# Patient Record
Sex: FEMALE | Employment: UNEMPLOYED | URBAN - METROPOLITAN AREA
[De-identification: names, ages, dates, MRNs, and addresses within clinical notes are randomized per-mention and may not be internally consistent; named-entity substitution may affect disease eponyms.]

---

## 2017-01-21 ENCOUNTER — HOSPITAL ENCOUNTER (EMERGENCY)
Facility: HOSPITAL | Age: 27
Discharge: NURSING, REHAB OR LONG-TERM CARE | End: 2017-01-21
Attending: EMERGENCY MEDICINE
Payer: MEDICAID

## 2017-01-21 VITALS
HEART RATE: 87 BPM | TEMPERATURE: 98 F | HEIGHT: 63 IN | DIASTOLIC BLOOD PRESSURE: 70 MMHG | BODY MASS INDEX: 26.58 KG/M2 | RESPIRATION RATE: 16 BRPM | WEIGHT: 150 LBS | SYSTOLIC BLOOD PRESSURE: 116 MMHG | OXYGEN SATURATION: 98 %

## 2017-01-21 DIAGNOSIS — F10.920 ALCOHOL INTOXICATION, UNCOMPLICATED (HCC): ICD-10-CM

## 2017-01-21 DIAGNOSIS — F12.10 MARIJUANA ABUSE: ICD-10-CM

## 2017-01-21 DIAGNOSIS — F14.10 COCAINE ABUSE (HCC): ICD-10-CM

## 2017-01-21 DIAGNOSIS — Z72.89 DELIBERATE SELF-CUTTING: Primary | ICD-10-CM

## 2017-01-21 DIAGNOSIS — Z00.8 MEDICAL CLEARANCE FOR PSYCHIATRIC ADMISSION: ICD-10-CM

## 2017-01-21 DIAGNOSIS — T14.91XA SUICIDAL BEHAVIOR WITH ATTEMPTED SELF-INJURY (HCC): ICD-10-CM

## 2017-01-21 LAB
ALBUMIN SERPL BCP-MCNC: 3.8 G/DL (ref 3.5–4.8)
ALP SERPL-CCNC: 53 U/L (ref 32–100)
ALT SERPL-CCNC: 22 U/L (ref 14–54)
ANION GAP SERPL CALC-SCNC: 9 MMOL/L (ref 0–18)
APAP SERPL-MCNC: 10 MCG/ML (ref 10–30)
APAP SERPL-MCNC: 39 MCG/ML (ref 10–30)
AST SERPL-CCNC: 25 U/L (ref 15–41)
B-HCG UR QL: NEGATIVE
BASOPHILS # BLD: 0 K/UL (ref 0–0.2)
BASOPHILS NFR BLD: 0 %
BILIRUB DIRECT SERPL-MCNC: 0 MG/DL (ref 0–0.2)
BILIRUB SERPL-MCNC: 0.3 MG/DL (ref 0.3–1.2)
BUN SERPL-MCNC: 9 MG/DL (ref 8–20)
BUN/CREAT SERPL: 13.8 (ref 10–20)
BZE UR QL SCN: DETECTED
CALCIUM SERPL-MCNC: 8.9 MG/DL (ref 8.5–10.5)
CANNABINOIDS UR QL SCN: DETECTED
CHLORIDE SERPL-SCNC: 112 MMOL/L (ref 95–110)
CK SERPL-CCNC: 63 U/L (ref 38–234)
CO2 SERPL-SCNC: 23 MMOL/L (ref 22–32)
CREAT SERPL-MCNC: 0.65 MG/DL (ref 0.5–1.5)
EOSINOPHIL # BLD: 0 K/UL (ref 0–0.7)
EOSINOPHIL NFR BLD: 1 %
ERYTHROCYTE [DISTWIDTH] IN BLOOD BY AUTOMATED COUNT: 14.1 % (ref 11–15)
ETHANOL SERPL-MCNC: 171 MG/DL
ETHANOL SERPL-MCNC: 23 MG/DL
GLUCOSE SERPL-MCNC: 93 MG/DL (ref 70–99)
HCT VFR BLD AUTO: 39.4 % (ref 35–48)
HGB BLD-MCNC: 13.3 G/DL (ref 12–16)
LYMPHOCYTES # BLD: 2.5 K/UL (ref 1–4)
LYMPHOCYTES NFR BLD: 34 %
MAGNESIUM SERPL-MCNC: 2.2 MG/DL (ref 1.8–2.5)
MCH RBC QN AUTO: 31.5 PG (ref 27–32)
MCHC RBC AUTO-ENTMCNC: 33.7 G/DL (ref 32–37)
MCV RBC AUTO: 93.6 FL (ref 80–100)
MONOCYTES # BLD: 0.6 K/UL (ref 0–1)
MONOCYTES NFR BLD: 9 %
NEUTROPHILS # BLD AUTO: 4.2 K/UL (ref 1.8–7.7)
NEUTROPHILS NFR BLD: 57 %
OSMOLALITY UR CALC.SUM OF ELEC: 296 MOSM/KG (ref 275–295)
PLATELET # BLD AUTO: 288 K/UL (ref 140–400)
PMV BLD AUTO: 8.8 FL (ref 7.4–10.3)
POTASSIUM SERPL-SCNC: 4.4 MMOL/L (ref 3.3–5.1)
PROT SERPL-MCNC: 7.5 G/DL (ref 5.9–8.4)
RBC # BLD AUTO: 4.2 M/UL (ref 3.7–5.4)
SALICYLATES SERPL-MCNC: <4 MG/DL (ref 2–29)
SODIUM SERPL-SCNC: 144 MMOL/L (ref 136–144)
WBC # BLD AUTO: 7.3 K/UL (ref 4–11)

## 2017-01-21 PROCEDURE — 80329 ANALGESICS NON-OPIOID 1 OR 2: CPT | Performed by: EMERGENCY MEDICINE

## 2017-01-21 PROCEDURE — 80048 BASIC METABOLIC PNL TOTAL CA: CPT | Performed by: EMERGENCY MEDICINE

## 2017-01-21 PROCEDURE — 80076 HEPATIC FUNCTION PANEL: CPT | Performed by: EMERGENCY MEDICINE

## 2017-01-21 PROCEDURE — 82550 ASSAY OF CK (CPK): CPT | Performed by: EMERGENCY MEDICINE

## 2017-01-21 PROCEDURE — 80320 DRUG SCREEN QUANTALCOHOLS: CPT | Performed by: EMERGENCY MEDICINE

## 2017-01-21 PROCEDURE — 83735 ASSAY OF MAGNESIUM: CPT | Performed by: EMERGENCY MEDICINE

## 2017-01-21 PROCEDURE — 80307 DRUG TEST PRSMV CHEM ANLYZR: CPT | Performed by: EMERGENCY MEDICINE

## 2017-01-21 PROCEDURE — 85025 COMPLETE CBC W/AUTO DIFF WBC: CPT | Performed by: EMERGENCY MEDICINE

## 2017-01-21 PROCEDURE — 99285 EMERGENCY DEPT VISIT HI MDM: CPT

## 2017-01-21 PROCEDURE — 93005 ELECTROCARDIOGRAM TRACING: CPT

## 2017-01-21 PROCEDURE — 81025 URINE PREGNANCY TEST: CPT | Performed by: EMERGENCY MEDICINE

## 2017-01-21 PROCEDURE — 93010 ELECTROCARDIOGRAM REPORT: CPT | Performed by: EMERGENCY MEDICINE

## 2017-01-21 PROCEDURE — 36415 COLL VENOUS BLD VENIPUNCTURE: CPT

## 2017-01-21 RX ORDER — LORAZEPAM 2 MG/ML
2 INJECTION INTRAMUSCULAR ONCE
Status: DISCONTINUED | OUTPATIENT
Start: 2017-01-21 | End: 2017-01-21
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
## 2017-01-21 NOTE — ED NOTES
Spoke with Avery Martinez case # [id number] from poison control. Stated patient has elevated acetaminophen level. Instructed by Avery Martinez to do a repeat tylenol level 4 hours after initial blood draw (repeat at approx 4pm) Lab addon for magnesium level and ck.  Treat symptom

## 2017-01-21 NOTE — ED NOTES
Per security patient cannot be in scrubs. Patient changed into gown, cooperatively. Ativan held at this time.  Dr. David Amaro made aware

## 2017-01-21 NOTE — ED NOTES
Patient tried to escape. Patient found by triage by security. Patient escorted back to room 24 by security and placed on seclusion.

## 2017-01-21 NOTE — ED NOTES
Patient arrived via ems. On talking with patient, pt states she \"took a bunch of melatonin this morning because I could not sleep\". \"I also drank a couple of beers at 7\". Patient denies any current suicidal or homicidal ideation.  +wrist lac noted to bi

## 2017-01-21 NOTE — ED INITIAL ASSESSMENT (HPI)
Pt brought in by EMS for psych evaluation, Pt drinking ETOH all night, took unknown amount of sleeping pills, cut marks on wrist in attempt to harm self.
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
## 2017-01-21 NOTE — ED NOTES
Per speaking with Dr. Babak Anne, patient stated she took three  500mg of tylenol at 7am this morning.
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
## 2017-01-21 NOTE — BH PROGRESS NOTE
Message left for Intake at [facility]. [facility] is OON with Medicaid.

## 2017-01-21 NOTE — ED NOTES
Security at bedside. Patient refusing to change into gown. Explained to patient of need to change into gown. Patient states she does not want to be naked. Scrubs provided to pt to change into.

## 2017-01-22 NOTE — BH PROGRESS NOTE
David called back and will accept pt. Intake will call back with Accepting Dr, bed # and nurse to nurse #.

## 2017-01-22 NOTE — BH PROGRESS NOTE
Accepting is Dr. Aramis Hernandez. Pt will be going to [address], Bed [address]. Nurse to Nurse is [phone] and ask for Addy pendleton. Intake is requesting a faxed copy of pt's EKG results also.

## 2017-01-22 NOTE — ED NOTES
[facility] arrived to transport patient to [facility]. Patient signed EMTALA form. Transfer documents provided to EMS personnel.

## 2017-01-22 NOTE — ED NOTES
Patient accepted to [facility] 76. 530 bed 1. Patient made aware. Accepting md is dr. Scott Rodriges.  Report given to Chasity Martinez [phone]

## 2018-10-28 ENCOUNTER — HOSPITAL ENCOUNTER (EMERGENCY)
Facility: HOSPITAL | Age: 28
Discharge: HOME | End: 2018-10-28
Attending: EMERGENCY MEDICINE
Payer: COMMERCIAL

## 2018-10-28 ENCOUNTER — APPOINTMENT (OUTPATIENT)
Dept: CT IMAGING | Facility: HOSPITAL | Age: 28
End: 2018-10-28
Attending: EMERGENCY MEDICINE
Payer: COMMERCIAL

## 2018-10-28 VITALS
RESPIRATION RATE: 16 BRPM | HEIGHT: 63 IN | DIASTOLIC BLOOD PRESSURE: 69 MMHG | BODY MASS INDEX: 28.35 KG/M2 | SYSTOLIC BLOOD PRESSURE: 103 MMHG | WEIGHT: 160 LBS | HEART RATE: 114 BPM | OXYGEN SATURATION: 97 %

## 2018-10-28 DIAGNOSIS — S01.01XA SCALP LACERATION, INITIAL ENCOUNTER: Primary | ICD-10-CM

## 2018-10-28 PROCEDURE — 72125 CT NECK SPINE W/O DYE: CPT | Performed by: EMERGENCY MEDICINE

## 2018-10-28 PROCEDURE — 99284 EMERGENCY DEPT VISIT MOD MDM: CPT

## 2018-10-28 PROCEDURE — 12001 RPR S/N/AX/GEN/TRNK 2.5CM/<: CPT

## 2018-10-28 PROCEDURE — 70450 CT HEAD/BRAIN W/O DYE: CPT | Performed by: EMERGENCY MEDICINE

## 2018-10-28 PROCEDURE — 81025 URINE PREGNANCY TEST: CPT
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
## 2020-11-09 ENCOUNTER — HOSPITAL ENCOUNTER (EMERGENCY)
Facility: HOSPITAL | Age: 30
Discharge: HOME | End: 2020-11-09
Attending: EMERGENCY MEDICINE
Payer: COMMERCIAL

## 2020-11-09 ENCOUNTER — APPOINTMENT (OUTPATIENT)
Dept: GENERAL RADIOLOGY | Facility: HOSPITAL | Age: 30
End: 2020-11-09
Attending: EMERGENCY MEDICINE
Payer: COMMERCIAL

## 2020-11-09 VITALS
BODY MASS INDEX: 26.58 KG/M2 | TEMPERATURE: 98 F | SYSTOLIC BLOOD PRESSURE: 132 MMHG | OXYGEN SATURATION: 97 % | WEIGHT: 150 LBS | HEART RATE: 84 BPM | DIASTOLIC BLOOD PRESSURE: 96 MMHG | HEIGHT: 63 IN | RESPIRATION RATE: 18 BRPM

## 2020-11-09 DIAGNOSIS — S92.344A CLOSED NONDISPLACED FRACTURE OF FOURTH METATARSAL BONE OF RIGHT FOOT, INITIAL ENCOUNTER: Primary | ICD-10-CM

## 2020-11-09 PROCEDURE — 99284 EMERGENCY DEPT VISIT MOD MDM: CPT

## 2020-11-09 PROCEDURE — 73610 X-RAY EXAM OF ANKLE: CPT | Performed by: EMERGENCY MEDICINE

## 2020-11-09 PROCEDURE — 73630 X-RAY EXAM OF FOOT: CPT | Performed by: EMERGENCY MEDICINE

## 2020-11-09 PROCEDURE — 29515 APPLICATION SHORT LEG SPLINT: CPT

## 2020-11-09 RX ORDER — IBUPROFEN 600 MG/1
600 TABLET ORAL ONCE
Status: COMPLETED | OUTPATIENT
Start: 2020-11-09 | End: 2020-11-09
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
## 2020-11-09 NOTE — ED PROVIDER NOTES
Patient Seen in: [facility] AND [facility] Emergency Department      History   Patient presents with:  Leg or Foot Injury    Stated Complaint:     HPI    80-year-old female presents for evaluation of right foot pain.   Patient was sliding down an empty water slid 1+ on the right side and 1+ on the left side. Pulmonary:      Effort: Pulmonary effort is normal. No respiratory distress.    Musculoskeletal:      Comments: Mild tenderness to right ankle, tender to proximal right foot, difficulty ranging due to this jose miguel am    Follow-up:  Fiona [address]  [phone]    Schedule an appointment as soon as possible for a visit in 3 days  For follow up (podiatry)    Akin Lara MD  [address] [place] [address]

## 2020-11-09 NOTE — ED INITIAL ASSESSMENT (HPI)
Pt reports fixing a roof and fell. Pt states that it was about 20 ft. Pt reports \" I broke my right foot\" pt denies LOC.  Cms intact

## 2022-08-02 NOTE — ED NOTES
PT safe to DC home per MD. Trae De La Cruz to dress self. DC teaching done, pt verbalizes understanding. Ambulatory with crutches to exit. 71

## 2023-11-04 NOTE — BH PROGRESS NOTE
Packet faxed to [facility] for review. The patient is Stable - Low risk of patient condition declining or worsening    Shift Goals  Clinical Goals: safety, pain management  Patient Goals: safety, pain management  Family Goals: JI    Progress made toward(s) clinical / shift goals:      Problem: Bladder / Voiding  Goal: Patient will establish and maintain regular urinary output  Outcome: Progressing  Note: Pt continent of bladder. Voiding adequately using urinal. He denies dysuria.     Problem: Bowel Elimination  Goal: Patient will participate in bowel management program  Outcome: Progressing  Note: Pt continent of bowel. On bowel meds. LBM 11/3/23.       Patient is not progressing towards the following goals:

## 2023-12-27 ENCOUNTER — APPOINTMENT (OUTPATIENT)
Dept: GENERAL RADIOLOGY | Facility: HOSPITAL | Age: 33
End: 2023-12-27
Attending: STUDENT IN AN ORGANIZED HEALTH CARE EDUCATION/TRAINING PROGRAM
Payer: MEDICAID

## 2023-12-27 ENCOUNTER — APPOINTMENT (OUTPATIENT)
Dept: CT IMAGING | Facility: HOSPITAL | Age: 33
End: 2023-12-27
Attending: STUDENT IN AN ORGANIZED HEALTH CARE EDUCATION/TRAINING PROGRAM
Payer: MEDICAID

## 2023-12-27 ENCOUNTER — HOSPITAL ENCOUNTER (EMERGENCY)
Facility: HOSPITAL | Age: 33
Discharge: HOME | End: 2023-12-27
Attending: STUDENT IN AN ORGANIZED HEALTH CARE EDUCATION/TRAINING PROGRAM
Payer: MEDICAID

## 2023-12-27 VITALS
DIASTOLIC BLOOD PRESSURE: 79 MMHG | RESPIRATION RATE: 18 BRPM | WEIGHT: 150 LBS | HEART RATE: 104 BPM | SYSTOLIC BLOOD PRESSURE: 127 MMHG | TEMPERATURE: 98 F | HEIGHT: 63 IN | BODY MASS INDEX: 26.58 KG/M2 | OXYGEN SATURATION: 100 %

## 2023-12-27 DIAGNOSIS — S02.19XA CLOSED SPHENOID SINUS FRACTURE, INITIAL ENCOUNTER (HCC): ICD-10-CM

## 2023-12-27 DIAGNOSIS — S02.0XXA CLOSED FRACTURE OF FRONTAL BONE, INITIAL ENCOUNTER (HCC): Primary | ICD-10-CM

## 2023-12-27 DIAGNOSIS — S02.401A CLOSED FRACTURE OF MAXILLARY SINUS, INITIAL ENCOUNTER (HCC): ICD-10-CM

## 2023-12-27 DIAGNOSIS — S02.85XA CLOSED FRACTURE OF ORBITAL WALL, INITIAL ENCOUNTER (HCC): ICD-10-CM

## 2023-12-27 LAB — B-HCG UR QL: NEGATIVE

## 2023-12-27 PROCEDURE — 99284 EMERGENCY DEPT VISIT MOD MDM: CPT

## 2023-12-27 PROCEDURE — 90471 IMMUNIZATION ADMIN: CPT

## 2023-12-27 PROCEDURE — 73590 X-RAY EXAM OF LOWER LEG: CPT | Performed by: STUDENT IN AN ORGANIZED HEALTH CARE EDUCATION/TRAINING PROGRAM

## 2023-12-27 PROCEDURE — 81025 URINE PREGNANCY TEST: CPT

## 2023-12-27 PROCEDURE — 70450 CT HEAD/BRAIN W/O DYE: CPT | Performed by: STUDENT IN AN ORGANIZED HEALTH CARE EDUCATION/TRAINING PROGRAM

## 2023-12-27 PROCEDURE — 70486 CT MAXILLOFACIAL W/O DYE: CPT | Performed by: STUDENT IN AN ORGANIZED HEALTH CARE EDUCATION/TRAINING PROGRAM

## 2023-12-27 RX ORDER — AMOXICILLIN AND CLAVULANATE POTASSIUM 875; 125 MG/1; MG/1
1 TABLET, FILM COATED ORAL 2 TIMES DAILY
Qty: 20 TABLET | Refills: 0 | Status: SHIPPED | OUTPATIENT
Start: 2023-12-27 | End: 2024-01-06

## 2023-12-27 RX ORDER — HYDROCODONE BITARTRATE AND ACETAMINOPHEN 5; 325 MG/1; MG/1
1-2 TABLET ORAL EVERY 6 HOURS PRN
Qty: 10 TABLET | Refills: 0 | Status: SHIPPED | OUTPATIENT
Start: 2023-12-27 | End: 2024-01-01

## 2023-12-27 RX ORDER — HYDROCODONE BITARTRATE AND ACETAMINOPHEN 5; 325 MG/1; MG/1
1 TABLET ORAL ONCE
Status: COMPLETED | OUTPATIENT
Start: 2023-12-27 | End: 2023-12-27

## 2023-12-27 NOTE — DISCHARGE INSTRUCTIONS
Take the antibiotics and pain medication as prescribed. It is imperative that you get follow-up with the facial surgeon as well as the oculoplastic surgeon. Ice and elevate your face. Avoid blowing your nose. Return to the ER immediately with worsening headache visual changes numbness weakness or tingling one-sided body, or if you develop clear discharge from nose or ears or bloody discharge from ears. Thanks we hope you feel better soon.
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
## 2024-03-14 ENCOUNTER — HOSPITAL ENCOUNTER (EMERGENCY)
Facility: HOSPITAL | Age: 34
Discharge: HOME | End: 2024-03-14
Attending: EMERGENCY MEDICINE
Payer: MEDICAID

## 2024-03-14 VITALS
TEMPERATURE: 98 F | SYSTOLIC BLOOD PRESSURE: 139 MMHG | OXYGEN SATURATION: 100 % | HEART RATE: 81 BPM | RESPIRATION RATE: 18 BRPM | DIASTOLIC BLOOD PRESSURE: 72 MMHG

## 2024-03-14 DIAGNOSIS — R09.A9 FOREIGN BODY SENSATION IN EAR CANAL, LEFT: Primary | ICD-10-CM

## 2024-03-14 PROCEDURE — 99283 EMERGENCY DEPT VISIT LOW MDM: CPT

## 2024-03-14 PROCEDURE — 99282 EMERGENCY DEPT VISIT SF MDM: CPT
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
## 2024-03-15 ENCOUNTER — TELEPHONE (OUTPATIENT)
Dept: OTOLARYNGOLOGY | Facility: CLINIC | Age: 34
End: 2024-03-15
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
## 2024-03-15 NOTE — TELEPHONE ENCOUNTER
Pt called.  Pt went to the emergency room for water bead stuck in the ear.  Caller requesting an appointment.  Call transferred to the nurse.

## 2024-03-15 NOTE — TELEPHONE ENCOUNTER
Patient stated she had an a water bead in left ear, she can hear but she feels like something is in there. ED was unable to remove or see water bead in ear. Scheduled appointment for patient.     Future Appointments   Date Time Provider Department Center   3/18/2024  1:40 PM Fabio Martínez MD [facility]

## 2024-03-18 ENCOUNTER — OFFICE VISIT (OUTPATIENT)
Dept: OTOLARYNGOLOGY | Facility: CLINIC | Age: 34
End: 2024-03-18

## 2024-03-18 DIAGNOSIS — T16.2XXA FOREIGN BODY OF LEFT EAR, INITIAL ENCOUNTER: Primary | ICD-10-CM
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
## 2024-05-08 ENCOUNTER — APPOINTMENT (OUTPATIENT)
Dept: GENERAL RADIOLOGY | Facility: HOSPITAL | Age: 34
End: 2024-05-08
Attending: STUDENT IN AN ORGANIZED HEALTH CARE EDUCATION/TRAINING PROGRAM
Payer: MEDICAID

## 2024-05-08 ENCOUNTER — HOSPITAL ENCOUNTER (EMERGENCY)
Facility: HOSPITAL | Age: 34
Discharge: HOME | End: 2024-05-08
Attending: STUDENT IN AN ORGANIZED HEALTH CARE EDUCATION/TRAINING PROGRAM
Payer: MEDICAID

## 2024-05-08 VITALS
DIASTOLIC BLOOD PRESSURE: 80 MMHG | HEART RATE: 109 BPM | OXYGEN SATURATION: 96 % | WEIGHT: 135 LBS | BODY MASS INDEX: 24 KG/M2 | TEMPERATURE: 98 F | SYSTOLIC BLOOD PRESSURE: 123 MMHG | RESPIRATION RATE: 18 BRPM

## 2024-05-08 DIAGNOSIS — S93.601A SPRAIN OF RIGHT FOOT, INITIAL ENCOUNTER: Primary | ICD-10-CM

## 2024-05-08 PROCEDURE — 99284 EMERGENCY DEPT VISIT MOD MDM: CPT

## 2024-05-08 PROCEDURE — 73630 X-RAY EXAM OF FOOT: CPT | Performed by: STUDENT IN AN ORGANIZED HEALTH CARE EDUCATION/TRAINING PROGRAM

## 2024-05-08 RX ORDER — ACETAMINOPHEN 500 MG
1000 TABLET ORAL ONCE
Status: COMPLETED | OUTPATIENT
Start: 2024-05-08 | End: 2024-05-08

## 2024-05-08 NOTE — ED PROVIDER NOTES
[facility] Emergency Department Note  Patient: Sachi Fraire Age: 33 year old Sex: female      MRN: [id number]  : 1990    Patient Seen in: [facility] Emergency Department    History     Chief Complaint   Patient presents with    Leg or Foot Injury     Stated Complaint: right foot pain    History obtained from: Patient    33-year-old female no significant past medical history presenting today for evaluation of right foot injury.  She states that last night at approximately 1030 last night, she slipped the stairs bending her foot and hyper plantarflexion.  States that she is in pain over the lateral aspect of the right foot.  States the pain is worse with any attempted ambulation.  Denies any numbness or tingling.    Review of Systems:  Review of Systems  Positive for stated complaint: right foot pain. Constitutional and vital signs reviewed. All other systems reviewed and negative except as noted above.    Patient History:  Past Medical History:    Chlamydia    Pyelonephritis       History reviewed. No pertinent surgical history.     Family History   Problem Relation Age of Onset    Hypertension Father     Seizure Disorder Mother     Heart Disease Paternal Grandfather        Specific Social Determinants of Health:   Social History     Socioeconomic History    Marital status: Single   Tobacco Use    Smoking status: Every Day     Current packs/day: 0.50     Types: Cigarettes    Smokeless tobacco: Never   Vaping Use    Vaping status: Never Used   Substance and Sexual Activity    Alcohol use: No    Drug use: No     Social Determinants of Health     Food Insecurity: No Food Insecurity (2023)    [facility]    Food Insecurity     Within the past 30 days, I worried whether my food would run out before I got money to buy more. / En los últimos 30 días, me preocupó que la comida se podía acabar antes de tener dinero para compr...: Never  true / Nunca     Within the past 30 days, the food that I bought just didn't last, and I didn't have money to get more. / En los últimos 30 días, La comida que compré no rindió lo suficiente, y no tenía dinero para...: Never true / Nunca           PSFH elements reviewed from today and agreed except as otherwise stated in HPI.    Physical Exam     ED Triage Vitals [05/08/24 1038]   /80   Pulse (!) 127   Resp 22   Temp 97.7 °F (36.5 °C)   Temp src Temporal   SpO2 96 %   O2 Device None (Room air)       Current:/80   Pulse 109   Temp 97.7 °F (36.5 °C) (Temporal)   Resp 18   Wt 61.2 kg   LMP 05/01/2024 (Approximate)   SpO2 96%   Breastfeeding No   BMI 23.91 kg/m²         Physical Exam  Constitutional:       General: She is not in acute distress.  HENT:      Head: Normocephalic and atraumatic.      Mouth/Throat:      Mouth: Mucous membranes are moist.   Eyes:      Extraocular Movements: Extraocular movements intact.   Cardiovascular:      Rate and Rhythm: Normal rate and regular rhythm.      Heart sounds: Normal heart sounds.   Pulmonary:      Effort: Pulmonary effort is normal. No respiratory distress.      Breath sounds: Normal breath sounds.   Abdominal:      Palpations: Abdomen is soft.      Tenderness: There is no abdominal tenderness.   Musculoskeletal:      Comments: Reproducible tenderness, soft tissue swelling and ecchymoses over the base of the fifth metatarsal, normal distal pulses equal bilaterally, normal active and passive range of motion of the right ankle.  No tenderness over the lateral medial malleolus, no sensory deficits in all neurologic distributions of the right foot   Skin:     General: Skin is warm and dry.      Capillary Refill: Capillary refill takes less than 2 seconds.      Findings: No rash.   Neurological:      General: No focal deficit present.      Mental Status: She is alert and oriented to person, place, and time.   Psychiatric:         Mood and Affect: Mood normal.          Behavior: Behavior normal.         ED Course   Labs:   Labs Reviewed - No data to display  Radiology findings:  I personally reviewed the images.   XR FOOT, COMPLETE (MIN 3 VIEWS), RIGHT (CPT=73630)    Result Date: 5/8/2024  CONCLUSION:  No acute fracture or dislocation.  Suggestion of mild hallux valgus with bunion formation.    Dictated by (CST): Itzel Vivar MD on 5/08/2024 at 12:21 PM     Finalized by (CST): Itzel Vivar MD on 5/08/2024 at 12:23 PM             Cardiac Monitor: Interpreted by me.   Pulse Readings from Last 1 Encounters:   05/08/24 109   , sinus,         MDM   33-year-old female with right foot injury obtained after mechanical fall yesterday.    Differential diagnoses considered includes, but is not limited to: Foot fracture versus dislocation versus sprain    Will obtain the following tests: X-ray right foot  Please see ED course for my independent review of these tests/imaging results.    Initial Medications/Therapeutics administered: Tylenol        ED Course as of 05/08/24 1343  ------------------------------------------------------------  Time: 05/08 1230  Comment: Independently reviewed the x-ray of the right foot images that show no evidence of obvious fracture or dislocation.  Agree with radiology read above.  Suspect symptoms secondary to foot sprain.  Discussed Tylenol and Motrin as needed for pain.  Ice and elevation as needed for swelling.  Provided with postop shoe and crutches for weightbearing as tolerated.  Stable for discharge home at this time with close PCP follow-up.  Return precautions provided and all questions answered.  Patient expressed understanding and agreement with plan.          Disposition and Plan     Clinical Impression:  1. Sprain of right foot, initial encounter        Disposition:  Discharge    Follow-up:  Monika Brunner MD  [address] [place] [address] [id number] [phone]    Schedule an appointment as soon as possible for a visit in 2 day(s)  As  needed, If symptoms worsen      Medications Prescribed:  Discharge Medication List as of 5/8/2024 12:40 PM            This note may have been created using voice dictation technology and may include inadvertent errors.      Gina Palmer MD  Emergency Medicine

## 2024-05-08 NOTE — DISCHARGE INSTRUCTIONS
Thank you for seeking care at [facility] Emergency Department.  As discussed, you should take Ibuprofen (also called Advil or Motrin) or Naproxen (also called Aleve) for your pain. If you are taking Ibuprofen, you can take 600 mg every 6 hours, or 800 mg every 8 hours. If you are taking Naproxen, you can take 500 mg every 12 hours. Do not take more than this amount as it can cause kidney problems or bleeding in your stomach. Do not take these medications at the same time as it can increase your risk for these side effects.    If your pain is not controlled you can also take Acetaminophen (also called Tylenol) 1 gram every 6 hours. Do not take more than 4 grams over the course of a day from all medications you take. You can take this medication in alternation with Ibuprofen so that every 3 hours you are taking either 600 mg of Ibuprofen or 1 gram of Acetaminophen.     As previously advised, please follow up with your primary care doctor for further management of your pain.

## 2024-05-08 NOTE — ED INITIAL ASSESSMENT (HPI)
Patient arrives via triage in a wheelchair c/o right foot pain. Fell down the basement stairs this morning. Denies hitting head.  No blood thinners.

## (undated) NOTE — LETTER
Date & Time: 5/8/2024, 12:34 PM  Patient: Sachi Fraire  Encounter Provider(s):    Gina Palmer MD       To Whom It May Concern:    Sachi Fraire was seen and treated in our department on 5/8/2024. She can return to work with these limitations: patient to use ortho shoe until symptoms resolved .    If you have any questions or concerns, please do not hesitate to call.        _____________________________  Physician/[facility] Signature

## (undated) NOTE — ED AVS SNAPSHOT
Griselda Ricardo   MRN: [id number]    Department:  [facility]   Date of Visit:  10/28/2018           Disclosure     Insurance plans vary and the physician(s) referred by the ER may not be covered by your plan.  Please conta CARE PHYSICIAN AT ONCE OR RETURN IMMEDIATELY TO THE EMERGENCY DEPARTMENT. If you have been prescribed any medication(s), please fill your prescription right away and begin taking the medication(s) as directed.   If you believe that any of the medications

## (undated) NOTE — LETTER
Date & Time: 11/9/2020, 9:37 AM  Patient: Negro Rowland  Encounter Provider(s):    Familia Mulligan MD       To Whom It May Concern:    Araceli Galarza was seen and treated in our department on 11/9/2020.  She should not return to work until [employer]